# Patient Record
Sex: FEMALE | Race: WHITE | URBAN - NONMETROPOLITAN AREA
[De-identification: names, ages, dates, MRNs, and addresses within clinical notes are randomized per-mention and may not be internally consistent; named-entity substitution may affect disease eponyms.]

---

## 2017-08-21 ENCOUNTER — HISTORY (OUTPATIENT)
Dept: FAMILY MEDICINE | Facility: OTHER | Age: 32
End: 2017-08-21

## 2017-08-21 ENCOUNTER — OFFICE VISIT - GICH (OUTPATIENT)
Dept: FAMILY MEDICINE | Facility: OTHER | Age: 32
End: 2017-08-21

## 2017-08-21 DIAGNOSIS — J02.9 ACUTE PHARYNGITIS: ICD-10-CM

## 2017-08-21 DIAGNOSIS — J00 ACUTE NASOPHARYNGITIS (COMMON COLD): ICD-10-CM

## 2017-08-21 LAB — STREP A ANTIGEN - HISTORICAL: NEGATIVE

## 2017-12-28 NOTE — PROGRESS NOTES
"Patient Information     Patient Name MRN Sex Stacy Orta [id number] Female 1985      Progress Notes by Cristina Chamberlain NP at 2017  2:30 PM     Author:  Cristina Chamberlain NP Service:  (none) Author Type:  PHYS- Nurse Practitioner     Filed:  2017  3:48 PM Encounter Date:  2017 Status:  Signed     :  Cristina Chamberlain NP (PHYS- Nurse Practitioner)            Nursing Notes:   aZida Oliver  2017  3:12 PM  Signed  Patient presents to the clinic for possible strep. S/sx started on Friday and include runny nose, sore throat, congestion. Patient states her brother had strep one week ago and they were exposed.   Zaida Oliver LPN............................ 2017 3:08 PM     SUBJECTIVE:    Stacy Antonio is a 32 y.o. female who presents for sore throat and runny nose    URI    This is a new problem. Episode onset: 2-3 days. The problem has been unchanged. There has been no fever. Associated symptoms include congestion, coughing and a sore throat. Pertinent negatives include no ear pain, plugged ear sensation, rhinorrhea, sinus pain, sneezing, swollen glands, vomiting or wheezing. She has tried NSAIDs and acetaminophen for the symptoms. The treatment provided mild relief.       No current outpatient prescriptions on file prior to visit.     No current facility-administered medications on file prior to visit.     and There are no active problems to display for this patient.      REVIEW OF SYSTEMS:  Review of Systems   HENT: Positive for congestion and sore throat. Negative for ear pain, rhinorrhea and sneezing.    Respiratory: Positive for cough. Negative for wheezing.    Gastrointestinal: Negative for vomiting.       OBJECTIVE:  /66  Pulse 72  Temp 98.3  F (36.8  C) (Tympanic)  Resp 18  Ht 1.676 m (5' 6\")  Wt 81.8 kg (180 lb 6.4 oz)  LMP 2017  Breastfeeding? No  BMI 29.12 kg/m2    EXAM:   Physical Exam   Constitutional: She is well-developed, " well-nourished, and in no distress.   HENT:   Head: Normocephalic and atraumatic.   Right Ear: Tympanic membrane and ear canal normal.   Left Ear: Tympanic membrane and ear canal normal.   Nose: Rhinorrhea present.   Mouth/Throat: Uvula is midline and mucous membranes are normal. Posterior oropharyngeal erythema present. No oropharyngeal exudate or posterior oropharyngeal edema.   Eyes: Conjunctivae are normal.   Neck: Neck supple.   Cardiovascular: Normal rate, regular rhythm and normal heart sounds.    Pulmonary/Chest: Effort normal and breath sounds normal. No respiratory distress. She has no wheezes. She has no rales.   Lymphadenopathy:     She has no cervical adenopathy.   Nursing note and vitals reviewed.    Results for orders placed or performed in visit on 08/21/17      RAPID STREP WITH REFLEX CULTURE      Result  Value Ref Range    STREP A ANTIGEN           Negative Negative       ASSESSMENT/PLAN:    ICD-10-CM    1. Sore throat J02.9 RAPID STREP WITH REFLEX CULTURE      RAPID STREP WITH REFLEX CULTURE   2. Acute nasopharyngitis J00         Plan:  Completed labs at today's visit RST.  I personally reviewed the labs with the patient/parent at the visit. Abnormalities include None. Home cares and OTC gone over. Symptoms likely due to virus. No antibiotic is needed at this time. Symptoms typically worse on days 2-5 and then stabilize and you are sick for days 5-12. Days 12-14 there is slow resolution and if there is a cough, studies show it can linger longer, however one is not as ill as in the beginning. If symptoms begin worsening or fail to improve after 14 days, return to clinic for reevaluation. All questions were answered and she is in agreement with plan.       ARLINE KIM NP ....................  8/21/2017   3:47 PM

## 2017-12-28 NOTE — PATIENT INSTRUCTIONS
"Patient Information     Patient Name MRN Stacy Chaparro [id number] Female 1985      Patient Instructions by Cristina Chamberlain NP at 2017  2:30 PM     Author:  Cristina Chamberlain NP Service:  (none) Author Type:  PHYS- Nurse Practitioner     Filed:  2017  3:37 PM Encounter Date:  2017 Status:  Signed     :  Cristina Chamberlain NP (PHYS- Nurse Practitioner)            Viral Sore Throat   ________________________________________________________________________  KEY POINTS    A viral sore throat is an infection of the throat caused by a virus.    A sore throat caused by a virus usually gets better on its own within 5 to 7 days. Your healthcare provider will usually not prescribe antibiotics because antibiotics do not kill viruses.    Follow the full course of treatment prescribed by your healthcare provider. Ask your healthcare provider how long it will take to recover, and how to take care of yourself at home.  ________________________________________________________________________  What is a viral sore throat?  A viral sore throat is an infection of the throat caused by a virus.  What is the cause?  Many different viruses can cause a sore throat, including:    Flu viruses    Common cold viruses    Coxsackievirus    Infectious mononucleosis (\"mono\") virus  What are the symptoms?  The symptoms will vary slightly depending on the type of virus causing the infection. Symptoms may include:    A raw feeling in the throat that makes breathing, swallowing, or speaking painful    Redness of the throat    Cough    Runny nose    Fever    Hoarseness    Tender, swollen glands in your neck    Earache (you may feel pain in your ears even though the problem is in your throat)    Painful sores on the throat, tongue, or roof of the mouth    Swollen tonsils    White coating or bumps on the tonsils and throat  Depending on the kind of virus causing your sore throat, you may also have symptoms " such as:    Feeling unusually tired for longer than 1 week    Headache    Rash    Muscle aches    Poor appetite  How is it diagnosed?  Your healthcare provider will ask about your symptoms and medical history and examine you. Your provider may swab your throat to test for strep infection, which is caused by bacteria. If your provider suspects mononucleosis, a blood test may also be done.  How is it treated?  A sore throat caused by a virus usually gets better on its own within 5 to 7 days. Your healthcare provider will usually not prescribe antibiotics because antibiotics do not kill viruses. Other possible treatment depends on the type of virus causing the infection.  How can I take care of myself?  Follow the full course of treatment prescribed by your healthcare provider. In addition:    Take nonprescription medicine, such as acetaminophen, ibuprofen, or naproxen to treat pain and fever. Read the label and take as directed. Unless recommended by your healthcare provider, you should not take these medicines for more than 10 days.    Nonsteroidal anti-inflammatory medicines (NSAIDs), such as ibuprofen, naproxen, and aspirin, may cause stomach bleeding and other problems. These risks increase with age.    Acetaminophen may cause liver damage or other problems. Unless recommended by your provider, don't take more than 3000 milligrams (mg) in 24 hours. To make sure you don t take too much, check other medicines you take to see if they also contain acetaminophen. Ask your provider if you need to avoid drinking alcohol while taking this medicine.    Don t smoke, and stay away from others who are smoking.    Avoid breathing dust and chemical fumes.    Get plenty of rest.    You may want to rest your throat by talking less and eating a diet that is mostly liquid or soft for a day or two. Avoid salty or spicy foods and citrus fruits. Drink extra fluids, such as water, fruit juice, and tea.    Use a humidifier to put more  moisture in the air. Avoid steam vaporizers because they can cause burns. Be sure to keep the humidifier clean, as recommended in the 's instructions. It's important to keep bacteria and mold from growing in the water container.    Cough drops may help relieve the soreness.    Gargling with warm saltwater and drinking warm liquids may help. (You can make a saltwater solution by adding 1/2 teaspoon of salt to 8 ounces, or 240 mL, of warm water.)  Ask your provider:    How and when you will get your test results    How long it will take to recover    If there are activities you should avoid and when you can return to your normal activities    How to take care of yourself at home    What symptoms or problems you should watch for and what to do if you have them  Make sure you know when you should come back for a checkup. Keep all appointments for provider visits or tests.  How can I help prevent viral sore throat?  If you are sick, you can help protect others if you:    Don t go to work or school. Avoid contact with other people except to get medical care.    Cover your nose and mouth with a tissue when you cough or sneeze. Throw the tissue in the trash after you use it, and then wash your hands. If you don t have a tissue, cough or sneeze into your upper sleeve instead of your hands.    Clean your hands often with soap and water or an alcohol-based hand , especially after using tissues or coughing or sneezing into your hands.  To lower your risk of getting a viral sore throat:    Wash your hands often and especially after using the restroom, coughing, sneezing, or blowing your nose. Also wash your hands before eating or touching your eyes.    Stay at least 6 feet away from people who are sick, if you can.    Keep surfaces clean--especially bedside tables, surfaces in the bathroom, and toys for children. Some viruses can live for hours on surfaces like cafeteria tables, doorknobs, and desks. Wipe them  down with a household disinfectant according to directions on the label.    Take care of your health. Try to get at least 7 to 9 hours of sleep each night. Eat a healthy diet and try to keep a healthy weight. If you smoke, try to quit. If you want to drink alcohol, ask your healthcare provider how much is safe for you to drink. Learn ways to manage stress. Exercise according to your healthcare provider's instructions.

## 2017-12-30 NOTE — NURSING NOTE
Patient Information     Patient Name MRN Stacy Chaparro [id number] Female 1985      Nursing Note by Zaida Oliver at 2017  2:30 PM     Author:  Zaida Oliver Service:  (none) Author Type:  NURS- Student Practical Nurse     Filed:  2017  3:12 PM Encounter Date:  2017 Status:  Signed     :  Zaida Oliver (NURS- Student Practical Nurse)            Patient presents to the clinic for possible strep. S/sx started on Friday and include runny nose, sore throat, congestion. Patient states her brother had strep one week ago and they were exposed.   Zaida Oliver, SHADIN............................ 2017 3:08 PM

## 2018-01-26 VITALS
TEMPERATURE: 98.3 F | HEIGHT: 66 IN | DIASTOLIC BLOOD PRESSURE: 66 MMHG | BODY MASS INDEX: 28.99 KG/M2 | RESPIRATION RATE: 18 BRPM | SYSTOLIC BLOOD PRESSURE: 112 MMHG | HEART RATE: 72 BPM | WEIGHT: 180.4 LBS

## 2018-03-05 ENCOUNTER — DOCUMENTATION ONLY (OUTPATIENT)
Dept: FAMILY MEDICINE | Facility: OTHER | Age: 33
End: 2018-03-05

## 2020-04-28 ENCOUNTER — NURSE TRIAGE (OUTPATIENT)
Dept: NURSING | Facility: CLINIC | Age: 35
End: 2020-04-28

## 2020-04-28 NOTE — TELEPHONE ENCOUNTER
About 3 hours ago, hit her head, hit straight on top of head on the microwave    Put ice on right away, and took Naproxen and then went straight to bed after    Dizzy and head is throbbing still, unable to sleep    Headache 5/10  -nagging throbbing  -constant    Dizziness 5/10  -history of vertigo, this is not quite as severe  -only when she stands up     No vomiting  No vision changes    Triaged to a disposition of See a physician within 4 hours. Offered [email or web address], ED, and  telephone visit.  Patient states she will wait to see if it improves from here but if it is not getting better she will make an appointment.     COVID 19 Nurse Triage Plan/Patient Instructions    Please be aware that novel coronavirus (COVID-19) may be circulating in the community. If you develop symptoms such as fever, cough, or SOB or if you have concerns about the presence of another infection including coronavirus (COVID-19), please contact your health care provider or visit [email or web address].     Disposition/Instructions    Patient to have an [facility] Visit with a provider (Preferred option). Follow System Ambulatory Workflow for COVID 19.     To do this follow these instructions:    1. Go to the website [email or web address]  2. Create an account (you will need your insurance information)  3. Start a new visit  4. Choose your diagnosis (e.g. COVID19)  5. Fill out the information about your symptoms  6. A provider will reach out to you by text, phone call or video visit based on your request    Call Back If: Your symptoms worsen before you are able to complete your [facility] Visit with a provider.  Patient to have an Urgent Care Telephone Visit with a provider. Follow System Ambulatory Workflow for COVID 19.     Urgent Care Telephone Visits are available between the hours of 8 am to 9 pm. Staff will assist patent in scheduling an appointment for this Urgent Care Telephone Visit.     Call Back If: Your symptoms worsen before you are able to  complete your Urgent Care Telephone Visit with a provider.    Thank you for limiting contact with others, wearing a simple mask to cover your cough, practice good hand hygiene habits and accessing our virtual services where possible to limit the spread of this virus.    For more information about COVID19 and options for caring for yourself at home, please visit the CDC website at https://www.cdc.gov/coronavirus/2019-ncov/about/steps-when-sick.html  For more options for care at [facility], please visit our website at [email or web address]    For more information, please use the [place] Department of Health COVID-19 Website: [email or web address]  [place] Department of Health ([facility]) COVID-19 Hotlines (Interpreters available):      Health questions: Phone Number: [phone] or 1-168.892.9012 and Hours: 7 a.m. to 7 p.m.    Schools and  questions: Phone Number: [phone] or [phone] and Hours 7 a.m. to 7 p.m      Reason for Disposition    [1] Dizziness caused by heat exposure, sudden standing, or poor fluid intake AND [2] no improvement after 2 hours of rest and fluids    Additional Information    Negative: [1] ACUTE NEURO SYMPTOM AND [2] present now  (DEFINITION: difficult to awaken OR confused thinking and talking OR slurred speech OR weakness of arms OR unsteady walking)    Negative: Knocked out (unconscious) > 1 minute    Negative: Seizure (convulsion) occurred  (Exception: prior history of seizures and now alert and without Acute Neuro Symptoms)    Negative: Penetrating head injury (e.g., knife, gun shot wound, metal object)    Negative: [1] Major bleeding (e.g., actively dripping or spurting) AND [2] can't be stopped    Negative: [1] Dangerous mechanism of injury (e.g., MVA, diving, trampoline, contact sports, fall > 10 feet or 3 meters) AND [2] NECK pain AND [3] began < 1 hour after injury    Negative: Sounds like a  "life-threatening emergency to the triager    Negative: [1] Recently examined and diagnosed with a concussion by a healthcare provider AND [2] questions about concussion symptoms    Negative: Can't remember what happened (amnesia)    Negative: Vomiting once or more    Negative: [1] Loss of vision or double vision AND [2] present now    Negative: Watery or blood-tinged fluid dripping from the NOSE or EARS now  (Exception: tears from crying or nosebleed from nasal trauma)    Negative: One or two \"black eyes\" (bruising, purple color of eyelids)    Negative: Large swelling or bruise > 2 inches (5 cm)    Negative: Skin is split open or gaping  (or length > 1/2 inch or 12 mm)    Negative: [1] Bleeding AND [2] won't stop after 10 minutes of direct pressure (using correct technique)    Negative: Sounds like a serious injury to the triager    Negative: [1] ACUTE NEURO SYMPTOM AND [2] now fine  (DEFINITION: difficult to awaken OR confused thinking and talking OR slurred speech OR weakness of arms OR unsteady walking)    Negative: [1] Knocked out (unconscious) < 1 minute AND [2] now fine    Negative: [1] SEVERE headache AND [2] not improved 2 hours after pain medicine/ice packs    Negative: Dangerous injury (e.g., MVA, diving, trampoline, contact sports, fall > 10 feet or 3 meters) or severe blow from hard object (e.g., golf club or baseball bat)    Negative: Taking Coumadin (warfarin) or other strong blood thinner, or known bleeding disorder (e.g., thrombocytopenia)    Negative: Suspicious history for the injury    Negative: [1] Age over 65 years AND [2] swelling or bruise    Negative: Patient is confused or is an unreliable provider of information (e.g., dementia, profound mental retardation, alcohol intoxication)    Negative: [1] Last tetanus shot > 5 years ago AND [2] DIRTY cut or scrape    Negative: [1] After 72 hours AND [2] headache persists    Scalp swelling, bruise or pain    Negative: Severe difficulty breathing " "(e.g., struggling for each breath, speaks in single words)    Negative: [1] Difficulty breathing or swallowing AND [2] started suddenly after medicine, an allergic food or bee sting    Negative: Shock suspected (e.g., cold/pale/clammy skin, too weak to stand, low BP, rapid pulse)    Negative: Difficult to awaken or acting confused (e.g., disoriented, slurred speech)    Negative: [1] Weakness (i.e., paralysis, loss of muscle strength) of the face, arm or leg on one side of the body AND [2] sudden onset AND [3] present now    Negative: [1] Numbness (i.e., loss of sensation) of the face, arm or leg on one side of the body AND [2] sudden onset AND [3] present now    Negative: [1] Loss of speech or garbled speech AND [2] sudden onset AND [3] present now    Negative: Overdose (accidental or intentional) of medications    Negative: [1] Fainted > 15 minutes ago AND [2] still feels too weak or dizzy to stand    Negative: Heart beating < 50 beats per minute OR > 140 beats per minute    Negative: Sounds like a life-threatening emergency to the triager    Negative: Chest pain    Negative: Rectal bleeding, bloody stool, or tarry-black stool    Negative: [1] Vomiting AND [2] contains red blood or black (\"coffee ground\") material    Negative: Vomiting is main symptom    Negative: Diarrhea is main symptom    Negative: Headache is main symptom    Negative: Patient states that he/she is having an anxiety/panic attack    Negative: Dizziness from low blood sugar (i.e., < 60 mg/dl or 3.5 mmol/l)    Negative: Dizziness is described as a spinning sensation (i.e., vertigo)    Negative: Heat exhaustion suspected (i.e., dehydration from heat exposure)    Negative: Difficulty breathing    Negative: SEVERE dizziness (e.g., unable to stand, requires support to walk, feels like passing out now)    Negative: Extra heart beats OR irregular heart beating  (i.e., \"palpitations\")    Negative: [1] Drinking very little AND [2] dehydration suspected " (e.g., no urine > 12 hours, very dry mouth, very lightheaded)    Negative: Patient sounds very sick or weak to the triager    Protocols used: DIZZINESS - [id number], HEAD INJURY-[place]RI[place]-    Marcela Laureano RN on 4/28/2020 at 7:39 PM